# Patient Record
(demographics unavailable — no encounter records)

---

## 2024-10-31 NOTE — DISCUSSION/SUMMARY
[FreeTextEntry1] : Unremarkable CBE and SBE reviewed Mammogram/Breast US completed Pelvic exam unremarkable Pap/HPV collected Declines STI screening I reviewed practices to support bone health including Vitamin D3 (2000 IU) and calcium rich foods with limitation on calcium supplementation by tabular form to 600 MG by mouth daily, a minimum of 30 minutes of weight bearing exercise at least 3 x weekly and limited daily sun exposure, 10-15 minutes.  Healthy diet, exercise and sleep hygiene discussed The importance of a screening colonoscopy was discussed and she is up to date on this.   RTO x 1 year or prn

## 2024-10-31 NOTE — HISTORY OF PRESENT ILLNESS
[HIV test declined] : HIV test declined [Syphilis test declined] : Syphilis test declined [Gonorrhea test declined] : Gonorrhea test declined [Chlamydia test declined] : Chlamydia test declined [Trichomonas test declined] : Trichomonas test declined [HPV test offered] : HPV test offered [Hepatitis B test declined] : Hepatitis B test declined [Hepatitis C test declined] : Hepatitis C test declined [perimenopausal] : perimenopausal [N] : Patient reports normal menses [Y] : Patient is sexually active [TextBox_4] : 57 yo F  LMP 2022 for annual visit. Hx adenoymyosis, and anemia. TVUS : 4.5 cm intramural fibroid and endometrial polyp.  Right breast biopsy for calcifications and family hx breast CA and left breast biopsy fibroadenomatoid nodule.  [Mammogramdate] : 10/24 [BreastSonogramDate] : 10/24 [PapSmeardate] : 9/23 [LMPDate] : 10/14/24 [PGHxTotal] : 2 [[facility] [[facility]

## 2024-10-31 NOTE — HISTORY OF PRESENT ILLNESS
[HIV test declined] : HIV test declined [Syphilis test declined] : Syphilis test declined [Gonorrhea test declined] : Gonorrhea test declined [Chlamydia test declined] : Chlamydia test declined [Trichomonas test declined] : Trichomonas test declined [HPV test offered] : HPV test offered [Hepatitis B test declined] : Hepatitis B test declined [Hepatitis C test declined] : Hepatitis C test declined [perimenopausal] : perimenopausal [N] : Patient reports normal menses [Y] : Patient is sexually active [TextBox_4] : 57 yo F  LMP 2022 for annual visit. Hx adenoymyosis, and anemia. TVUS : 4.5 cm intramural fibroid and endometrial polyp.  Right breast biopsy for calcifications and family hx breast CA and left breast biopsy fibroadenomatoid nodule.  [Mammogramdate] : 10/24 [BreastSonogramDate] : 10/24 [PapSmeardate] : 9/23 [LMPDate] : 10/14/24 [PGHxTotal] : 2 [[facility] [[facility]

## 2024-12-09 NOTE — HISTORY OF PRESENT ILLNESS
[FreeTextEntry1] : 57 yo  presents for follow up of hx adenoymyosis, and anemia. TVUS 8/21: 4.5 cm intramural fibroid and endometrial polyp. LMP 9/2024  TVUS today demonstrates: heterogenous uterus EML is 4mm Bilateral ovarian cysts. Right ovarian simple cyst 2.1x2.8x2.1 Cluster cysts left ovary 2.2 cm, 1.8cm and 0.8cm Anterior intramural fibroid has decrease in comparison to last year and is now 3.6x4.3x3.1 New anterior subserosal fibroid 1.2x1.5x1.1  Patient denies any AUB or pelvic pain
[FreeTextEntry1] : 57 yo  presents for follow up of hx adenoymyosis, and anemia. TVUS 8/21: 4.5 cm intramural fibroid and endometrial polyp. LMP 9/2024  TVUS today demonstrates: heterogenous uterus EML is 4mm Bilateral ovarian cysts. Right ovarian simple cyst 2.1x2.8x2.1 Cluster cysts left ovary 2.2 cm, 1.8cm and 0.8cm Anterior intramural fibroid has decrease in comparison to last year and is now 3.6x4.3x3.1 New anterior subserosal fibroid 1.2x1.5x1.1  Patient denies any AUB or pelvic pain
Improved.

## 2024-12-09 NOTE — DISCUSSION/SUMMARY
[FreeTextEntry1] : I reviewed today's TVUS results in detail with patient today She denies any VB or pelvic pain Recommendation is to repeat TVUS in one year with annual RTO with any AUB or pelvic pain  Patient verbalizes understanding of and agreement with this plan.  All questions answered to patient's satisfaction.